# Patient Record
Sex: FEMALE | Race: WHITE | Employment: FULL TIME | URBAN - METROPOLITAN AREA
[De-identification: names, ages, dates, MRNs, and addresses within clinical notes are randomized per-mention and may not be internally consistent; named-entity substitution may affect disease eponyms.]

---

## 2018-09-09 ENCOUNTER — APPOINTMENT (OUTPATIENT)
Dept: GENERAL RADIOLOGY | Age: 63
End: 2018-09-09
Payer: COMMERCIAL

## 2018-09-09 ENCOUNTER — HOSPITAL ENCOUNTER (EMERGENCY)
Age: 63
Discharge: HOME | End: 2018-09-09
Attending: EMERGENCY MEDICINE
Payer: COMMERCIAL

## 2018-09-09 VITALS
BODY MASS INDEX: 19.73 KG/M2 | HEIGHT: 66 IN | WEIGHT: 122.8 LBS | RESPIRATION RATE: 16 BRPM | SYSTOLIC BLOOD PRESSURE: 95 MMHG | OXYGEN SATURATION: 98 % | DIASTOLIC BLOOD PRESSURE: 68 MMHG | HEART RATE: 58 BPM | TEMPERATURE: 97.8 F

## 2018-09-09 DIAGNOSIS — S46.002A INJURY OF LEFT ROTATOR CUFF, INITIAL ENCOUNTER: Primary | ICD-10-CM
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
## 2018-09-09 PROCEDURE — 6370000000 HC RX 637 (ALT 250 FOR IP): Performed by: NURSE PRACTITIONER

## 2018-09-09 PROCEDURE — 99283 EMERGENCY DEPT VISIT LOW MDM: CPT

## 2018-09-09 PROCEDURE — 73030 X-RAY EXAM OF SHOULDER: CPT

## 2018-09-09 RX ORDER — LEVOTHYROXINE SODIUM 175 UG/1
112 TABLET ORAL DAILY
COMMUNITY

## 2018-09-09 RX ORDER — ESCITALOPRAM OXALATE 20 MG/1
20 TABLET ORAL DAILY
COMMUNITY

## 2018-09-09 RX ORDER — HYDROCODONE BITARTRATE AND ACETAMINOPHEN 5; 325 MG/1; MG/1
1 TABLET ORAL EVERY 6 HOURS PRN
Qty: 20 TABLET | Refills: 0 | Status: SHIPPED | OUTPATIENT
Start: 2018-09-09 | End: 2018-09-14

## 2018-09-09 RX ORDER — TIZANIDINE 2 MG/1
2 TABLET ORAL EVERY 6 HOURS PRN
COMMUNITY

## 2018-09-09 RX ORDER — ASCORBIC ACID 500 MG
500 TABLET ORAL DAILY
COMMUNITY

## 2018-09-09 RX ORDER — IBUPROFEN 800 MG/1
800 TABLET ORAL EVERY 8 HOURS PRN
Qty: 20 TABLET | Refills: 0 | Status: SHIPPED | OUTPATIENT
Start: 2018-09-09

## 2018-09-09 RX ORDER — HYDROCODONE BITARTRATE AND ACETAMINOPHEN 5; 325 MG/1; MG/1
1 TABLET ORAL ONCE
Status: COMPLETED | OUTPATIENT
Start: 2018-09-09 | End: 2018-09-09

## 2018-09-09 RX ADMIN — HYDROCODONE BITARTRATE AND ACETAMINOPHEN 1 TABLET: 5; 325 TABLET ORAL at 16:50

## 2018-09-09 ASSESSMENT — PAIN DESCRIPTION - DESCRIPTORS: DESCRIPTORS: PRESSURE;ACHING;SHARP

## 2018-09-09 ASSESSMENT — PAIN SCALES - GENERAL: PAINLEVEL_OUTOF10: 9

## 2018-09-09 ASSESSMENT — ENCOUNTER SYMPTOMS: COLOR CHANGE: 0

## 2018-09-09 ASSESSMENT — PAIN DESCRIPTION - FREQUENCY: FREQUENCY: CONTINUOUS

## 2018-09-09 ASSESSMENT — PAIN DESCRIPTION - ORIENTATION: ORIENTATION: LEFT

## 2018-09-09 ASSESSMENT — PAIN DESCRIPTION - LOCATION: LOCATION: BACK

## 2018-09-09 NOTE — ED PROVIDER NOTES
The patient was seen and examined by me in conjunction with the mid-level provider. I agree with his/her assessment and treatment plan. My clinical impression is that she has a rotator cuff strain. She was advised not to use the sling for more than 3 days and she will follow-up with orthopedics.      Abbi Pearce MD  09/09/18 3391

## 2018-09-09 NOTE — ED NOTES
Patient is brought to room in [facility] and ice pack for shoulder and arm elevated with jethro Long RN  09/09/18 8664

## 2018-09-09 NOTE — ED PROVIDER NOTES
ACID) 500 MG TABLET    Take 500 mg by mouth daily       ALLERGIES     Patient has no known allergies. FAMILY HISTORY     History reviewed. No pertinent family history. SOCIAL HISTORY       Social History     Social History    Marital status:      Spouse name: N/A    Number of children: N/A    Years of education: N/A     Social History Main Topics    Smoking status: Current Some Day Smoker     Types: Cigarettes    Smokeless tobacco: Never Used    Alcohol use Yes      Comment: wine daily    Drug use: No    Sexual activity: Not Asked     Other Topics Concern    None     Social History Narrative    None         REVIEW OF SYSTEMS    (2-9 systems for level 4, 10 or more for level 5)     Review of Systems   Musculoskeletal: Positive for arthralgias. Skin: Negative for color change and wound. All other systems reviewed and are negative. Except as noted above the remainder of the review of systems was reviewed and negative. PHYSICAL EXAM    (up to 7 for level 4, 8 or more for level 5)     ED Triage Vitals [09/09/18 1557]   BP Temp Temp Source Pulse Resp SpO2 Height Weight   95/68 97.8 °F (36.6 °C) Oral 58 16 98 % 5' 6\" (1.676 m) 122 lb 12.8 oz (55.7 kg)       Physical Exam   Constitutional: She is oriented to person, place, and time. She appears well-developed and well-nourished. HENT:   Head: Normocephalic and atraumatic. Right Ear: External ear normal.   Left Ear: External ear normal.   Nose: Nose normal.   Mouth/Throat: Oropharynx is clear and moist.   Eyes: Pupils are equal, round, and reactive to light. Conjunctivae and EOM are normal.   Neck: Normal range of motion. Neck supple. Cardiovascular: Intact distal pulses and normal pulses. Pulses:       Radial pulses are 2+ on the left side. Pulmonary/Chest: Effort normal. No respiratory distress. Musculoskeletal:        Left shoulder: She exhibits decreased range of motion, tenderness, pain and decreased strength. Left elbow: Normal.        Right wrist: Normal.        Left wrist: Normal.        Left upper arm: Normal.        Left forearm: Normal.        Right hand: Normal.        Left hand: Normal.   Patient exhibits decreased range of motion with her left shoulder. There is no deformity. She is barely able to lift her left arm off the bed. She exhibits pain palpation to the anterior lateral aspect of the shoulder. There is no ecchymosis or erythema. Neurological: She is alert and oriented to person, place, and time. She has normal strength and normal reflexes. No cranial nerve deficit or sensory deficit. Skin: Skin is warm, dry and intact. No ecchymosis and no rash noted. No erythema. Psychiatric: She has a normal mood and affect. Her behavior is normal. Judgment and thought content normal.         DIAGNOSTIC RESULTS     EKG: All EKG's are interpreted by the Emergency Department Physician who either signs or Co-signs this chart in the absence of a cardiologist.        RADIOLOGY:   Non-plain film images such as CT, Ultrasound and MRI are read by the radiologist. [facility] radiographic images are visualized and preliminarily interpreted by the emergency physician with the below findings:  Xr Shoulder Left (min 2 Views)    Result Date: 9/9/2018  EXAMINATION: 3 XRAY VIEWS OF THE LEFT SHOULDER 9/9/2018 4:46 pm COMPARISON: None. HISTORY: ORDERING SYSTEM PROVIDED HISTORY: Pain, injury from fall today TECHNOLOGIST PROVIDED HISTORY: Pain, injury from fall today Ordering Physician Provided Reason for Exam: fall Acuity: Acute Type of Exam: Initial FINDINGS: The humeral head is well circumscribed and situated within the glenoid fossa. No acute fracture, dislocation, or effusion is noted. Mild acromioclavicular degenerative changes are present. Soft tissues are unremarkable with exception of compression plate and screws in situ at the base of the cervical spine. Mild degenerative changes in the acromioclavicular joint.   No

## 2018-09-11 ENCOUNTER — OFFICE VISIT (OUTPATIENT)
Dept: ORTHOPEDIC SURGERY | Age: 63
End: 2018-09-11
Payer: COMMERCIAL

## 2018-09-11 VITALS — HEIGHT: 66 IN | WEIGHT: 122.8 LBS | BODY MASS INDEX: 19.73 KG/M2

## 2018-09-11 DIAGNOSIS — S40.022A CONTUSION OF MULTIPLE SITES OF LEFT SHOULDER AND UPPER ARM, INITIAL ENCOUNTER: Primary | ICD-10-CM

## 2018-09-11 DIAGNOSIS — S40.012A CONTUSION OF MULTIPLE SITES OF LEFT SHOULDER AND UPPER ARM, INITIAL ENCOUNTER: Primary | ICD-10-CM

## 2018-09-11 PROCEDURE — 99202 OFFICE O/P NEW SF 15 MIN: CPT | Performed by: ORTHOPAEDIC SURGERY

## 2018-09-11 RX ORDER — IRON POLYSACCHARIDE COMPLEX 150 MG
1 CAPSULE ORAL
COMMUNITY

## 2018-09-11 RX ORDER — GABAPENTIN 600 MG/1
400 TABLET ORAL 3 TIMES DAILY
COMMUNITY

## 2018-09-11 NOTE — PROGRESS NOTES
This 80-year-old patient is seen here because of an injury she sustained to her left shoulder on September 9. She was a caulking up the steps in her house and missed it and fell onto the left shoulder. She got to the emergency room at [facility]. and on September 9. He was given a sling and advised to follow-up here. The patient denies having had any problem with the shoulder before. There is no numbness or tingling. She describes the pain right over the shoulder area and slightly anterolaterally. Examination: There is no ecchymosis and no swelling. Her tenderness was generalized. She did have limitation of all the motions. X-rays: I reviewed the x-rays which showed no abnormality. Diagnosis contusion left shoulder. Treatment: I have advised her to start pendulum exercises in about 10 days' time and I will see her in 2 weeks. As far as the sling is concerned she can take it off for shower and also if she feels comfortable she does not need to use it. At next visit if she is not improving then may require physical therapy.

## 2018-10-09 ENCOUNTER — OFFICE VISIT (OUTPATIENT)
Dept: ORTHOPEDIC SURGERY | Age: 63
End: 2018-10-09
Payer: COMMERCIAL

## 2018-10-09 VITALS — WEIGHT: 125 LBS | HEIGHT: 67 IN | BODY MASS INDEX: 19.62 KG/M2

## 2018-10-09 DIAGNOSIS — M75.102 TEAR OF LEFT ROTATOR CUFF, UNSPECIFIED TEAR EXTENT: ICD-10-CM

## 2018-10-09 DIAGNOSIS — M25.512 ACUTE PAIN OF LEFT SHOULDER: Primary | ICD-10-CM

## 2018-10-09 PROCEDURE — 99213 OFFICE O/P EST LOW 20 MIN: CPT | Performed by: ORTHOPAEDIC SURGERY

## 2018-10-09 PROCEDURE — 20610 DRAIN/INJ JOINT/BURSA W/O US: CPT | Performed by: ORTHOPAEDIC SURGERY

## 2018-10-09 RX ORDER — METHYLPREDNISOLONE ACETATE 40 MG/ML
40 INJECTION, SUSPENSION INTRA-ARTICULAR; INTRALESIONAL; INTRAMUSCULAR; SOFT TISSUE ONCE
Status: COMPLETED | OUTPATIENT
Start: 2018-10-09 | End: 2018-10-09

## 2018-10-09 RX ADMIN — METHYLPREDNISOLONE ACETATE 40 MG: 40 INJECTION, SUSPENSION INTRA-ARTICULAR; INTRALESIONAL; INTRAMUSCULAR; SOFT TISSUE at 08:18

## 2018-10-16 ENCOUNTER — HOSPITAL ENCOUNTER (OUTPATIENT)
Dept: MRI IMAGING | Age: 63
Discharge: HOME | End: 2018-10-18
Payer: COMMERCIAL

## 2018-10-16 DIAGNOSIS — M75.102 TEAR OF LEFT ROTATOR CUFF, UNSPECIFIED TEAR EXTENT: ICD-10-CM

## 2018-10-16 PROCEDURE — 73221 MRI JOINT UPR EXTREM W/O DYE: CPT

## 2018-10-25 ENCOUNTER — OFFICE VISIT (OUTPATIENT)
Dept: ORTHOPEDIC SURGERY | Age: 63
End: 2018-10-25
Payer: COMMERCIAL

## 2018-10-25 VITALS — WEIGHT: 125 LBS | BODY MASS INDEX: 20.09 KG/M2 | HEIGHT: 66 IN

## 2018-10-25 DIAGNOSIS — S42.255D CLOSED NONDISPLACED FRACTURE OF GREATER TUBEROSITY OF LEFT HUMERUS WITH ROUTINE HEALING, SUBSEQUENT ENCOUNTER: Primary | ICD-10-CM

## 2018-10-25 PROBLEM — S42.255A CLOSED NONDISPLACED FRACTURE OF GREATER TUBEROSITY OF LEFT HUMERUS: Status: ACTIVE | Noted: 2018-10-25

## 2018-10-25 PROCEDURE — 23620 CLTX GR HMRL TBRS FX WO MNPJ: CPT | Performed by: ORTHOPAEDIC SURGERY

## 2018-10-25 PROCEDURE — 99213 OFFICE O/P EST LOW 20 MIN: CPT | Performed by: ORTHOPAEDIC SURGERY

## 2018-11-27 ENCOUNTER — OFFICE VISIT (OUTPATIENT)
Dept: ORTHOPEDIC SURGERY | Age: 63
End: 2018-11-27
Payer: COMMERCIAL

## 2018-11-27 VITALS — HEIGHT: 66 IN | BODY MASS INDEX: 20.09 KG/M2 | WEIGHT: 125 LBS

## 2018-11-27 DIAGNOSIS — M25.512 ACUTE PAIN OF LEFT SHOULDER: ICD-10-CM

## 2018-11-27 DIAGNOSIS — S42.255D CLOSED NONDISPLACED FRACTURE OF GREATER TUBEROSITY OF LEFT HUMERUS WITH ROUTINE HEALING, SUBSEQUENT ENCOUNTER: ICD-10-CM

## 2018-11-27 DIAGNOSIS — M75.42 IMPINGEMENT SYNDROME OF LEFT SHOULDER REGION: Primary | ICD-10-CM

## 2018-11-27 PROCEDURE — 20610 DRAIN/INJ JOINT/BURSA W/O US: CPT | Performed by: ORTHOPAEDIC SURGERY

## 2018-11-27 PROCEDURE — 99213 OFFICE O/P EST LOW 20 MIN: CPT | Performed by: ORTHOPAEDIC SURGERY

## 2018-11-27 RX ORDER — METHYLPREDNISOLONE ACETATE 40 MG/ML
40 INJECTION, SUSPENSION INTRA-ARTICULAR; INTRALESIONAL; INTRAMUSCULAR; SOFT TISSUE ONCE
Status: COMPLETED | OUTPATIENT
Start: 2018-11-27 | End: 2018-11-27

## 2018-11-27 RX ADMIN — METHYLPREDNISOLONE ACETATE 40 MG: 40 INJECTION, SUSPENSION INTRA-ARTICULAR; INTRALESIONAL; INTRAMUSCULAR; SOFT TISSUE at 07:55

## 2018-11-27 NOTE — PROGRESS NOTES
This patient who has sustained a fracture left greater tuberosity diagnosis only per MRI is going to physical therapy. She says she still having some problem getting full motion of the shoulder. The patient has also previously had undergone surgery for his fusion of the C5-C6 and was told by the treating physician that she may need extension of the fusion. Examination: The shoulder does have excellent range of motion but she has also positive impingement sign particularly in abduction at the extreme. There was no drop arm to signed. Examination of the back shows that she has minimal scoliosis on the left side. This was prompted by noting that the left shoulder was higher than the right side. I reviewed the x-rays with her which also show that she has acromioclavicular joint arthrosis. X-rays: I reviewed the x-rays of the shoulder with her which shows that she does have some acromioclavicular joint arthrosis base seen on the MRI. I also reviewed x-rays of the chest she has had in the past which shows minimal high thoracic scoliosis. Diagnosis: Impingement syndrome left shoulder. #2 healing fracture greater tuberosity left shoulder. #3 cervical spondylosis status post C5-C6 fusion. Treatment: After thoroughly cleaning the skin with Betadine and alcohol I injected the subacromial space with 40 mg Depo-Medrol and 5 mL of 1% plain lidocaine and she did immediate excellent response to this suggesting shoulder being the cause of for residual symptoms of pain and some impingement. I have told her out of the injection works and I will see her in 3 weeks' time but should her symptoms have subsided that she will call up and cancel the appointment.

## 2020-11-26 ENCOUNTER — APPOINTMENT (OUTPATIENT)
Dept: GENERAL RADIOLOGY | Age: 65
End: 2020-11-26
Payer: COMMERCIAL

## 2020-11-26 ENCOUNTER — HOSPITAL ENCOUNTER (EMERGENCY)
Age: 65
Discharge: HOME | End: 2020-11-26
Attending: EMERGENCY MEDICINE
Payer: COMMERCIAL

## 2020-11-26 ENCOUNTER — NURSE TRIAGE (OUTPATIENT)
Dept: OTHER | Facility: CLINIC | Age: 65
End: 2020-11-26

## 2020-11-26 VITALS
WEIGHT: 106.2 LBS | OXYGEN SATURATION: 100 % | RESPIRATION RATE: 16 BRPM | HEART RATE: 65 BPM | SYSTOLIC BLOOD PRESSURE: 113 MMHG | TEMPERATURE: 98.6 F | BODY MASS INDEX: 17.07 KG/M2 | DIASTOLIC BLOOD PRESSURE: 68 MMHG | HEIGHT: 66 IN

## 2020-11-26 LAB
SARS-COV-2, RAPID: ABNORMAL
SARS-COV-2: ABNORMAL
SARS-COV-2: DETECTED
SOURCE: ABNORMAL

## 2020-11-26 PROCEDURE — 99282 EMERGENCY DEPT VISIT SF MDM: CPT

## 2020-11-26 PROCEDURE — U0003 INFECTIOUS AGENT DETECTION BY NUCLEIC ACID (DNA OR RNA); SEVERE ACUTE RESPIRATORY SYNDROME CORONAVIRUS 2 (SARS-COV-2) (CORONAVIRUS DISEASE [COVID-19]), AMPLIFIED PROBE TECHNIQUE, MAKING USE OF HIGH THROUGHPUT TECHNOLOGIES AS DESCRIBED BY CMS-2020-01-R: HCPCS

## 2020-11-26 PROCEDURE — 71045 X-RAY EXAM CHEST 1 VIEW: CPT

## 2020-11-26 RX ORDER — OXYCODONE HYDROCHLORIDE AND ACETAMINOPHEN 5; 325 MG/1; MG/1
1 TABLET ORAL EVERY 8 HOURS PRN
COMMUNITY

## 2020-11-26 RX ORDER — HYDROXYCHLOROQUINE SULFATE 200 MG/1
TABLET, FILM COATED ORAL NIGHTLY
COMMUNITY

## 2020-11-26 RX ORDER — AZITHROMYCIN 250 MG/1
250 TABLET, FILM COATED ORAL SEE ADMIN INSTRUCTIONS
Qty: 6 TABLET | Refills: 0 | Status: SHIPPED | OUTPATIENT
Start: 2020-11-26 | End: 2020-12-01

## 2020-11-26 RX ORDER — SERTRALINE HYDROCHLORIDE 100 MG/1
100 TABLET, FILM COATED ORAL NIGHTLY
COMMUNITY

## 2020-11-26 ASSESSMENT — ENCOUNTER SYMPTOMS
COLOR CHANGE: 0
EYE REDNESS: 0
COUGH: 1
SORE THROAT: 0
RHINORRHEA: 0
SHORTNESS OF BREATH: 1
NAUSEA: 0
VOMITING: 0
EYE DISCHARGE: 0
DIARRHEA: 0

## 2020-11-26 ASSESSMENT — PAIN DESCRIPTION - DESCRIPTORS: DESCRIPTORS: ACHING;SHARP

## 2020-11-26 ASSESSMENT — PAIN SCALES - GENERAL: PAINLEVEL_OUTOF10: 7

## 2020-11-26 ASSESSMENT — PAIN DESCRIPTION - FREQUENCY: FREQUENCY: CONTINUOUS

## 2020-11-26 ASSESSMENT — PAIN DESCRIPTION - LOCATION: LOCATION: GENERALIZED;HEAD

## 2020-11-26 NOTE — TELEPHONE ENCOUNTER
CALL RECEIVED FROM: Patient  PATIENT STATES: yesterday symptoms started. Per screening tool fever, muscle pain, joint pain, headache, weakness, fatigue, cough, sob, red eyes, sore throat. PROTOCOL RECOMMENDATION: Go to ED, care advice given and understood. TRANSFER TO:   PLEASE DO NOT RESPOND TO THIS TRIAGE NOTE THROUGH THIS ENCOUNTER. ANY SUBSEQUENT COMMUNICATION SHOULD BE DIRECTLY WITH THE PATIENT. Reason for Disposition   MILD difficulty breathing (e.g., minimal/no SOB at rest, SOB with walking, pulse <100)    Answer Assessment - Initial Assessment Questions  1. COVID-19 DIAGNOSIS: \"Who made your Coronavirus (COVID-19) diagnosis? \" \"Was it confirmed by a positive lab test?\" If not diagnosed by a HCP, ask \"Are there lots of cases (community spread) where you live? \" (See public health department website, if unsure)      n/a  2. COVID-19 EXPOSURE: \"Was there any known exposure to COVID before the symptoms began? \" CDC Definition of close contact: within 6 feet (2 meters) for a total of 15 minutes or more over a 24-hour period. Yes, from work  3. ONSET: \"When did the COVID-19 symptoms start? \"       yesterday  4. WORST SYMPTOM: \"What is your worst symptom? \" (e.g., cough, fever, shortness of breath, muscle aches)      Headache and bodyache  5. COUGH: \"Do you have a cough? \" If so, ask: \"How bad is the cough? \"        Dry cough, frequent, mild  6. FEVER: \"Do you have a fever? \" If so, ask: \"What is your temperature, how was it measured, and when did it start? \"      101 this morning  7. RESPIRATORY STATUS: \"Describe your breathing? \" (e.g., shortness of breath, wheezing, unable to speak)       Shallow breathing, tightness  8. BETTER-SAME-WORSE: Catheline Aus you getting better, staying the same or getting worse compared to yesterday? \"  If getting worse, ask, \"In what way? \"      worse  9. HIGH RISK DISEASE: \"Do you have any chronic medical problems? \" (e.g., asthma, heart or lung disease, weak immune system, obesity, etc.)      Lupus possible,   10. PREGNANCY: \"Is there any chance you are pregnant? \" \"When was your last menstrual period? \"        n/a  11. OTHER SYMPTOMS: \"Do you have any other symptoms? \"  (e.g., chills, fatigue, headache, loss of smell or taste, muscle pain, sore throat; new loss of smell or taste especially support the diagnosis of COVID-19)        See above    Protocols used: CORONAVIRUS (COVID-19) DIAGNOSED OR SUSPECTED-ADULT-

## 2020-11-26 NOTE — ED PROVIDER NOTES
EMERGENCY DEPARTMENT ENCOUNTER    Pt Name: Alexis Oscar  MRN: [id number]  [place] 1955  Date of evaluation: 11/26/20  CHIEF COMPLAINT       Chief Complaint   Patient presents with    Shortness of Breath     onset yesterday    Cough    Headache     HISTORY OF PRESENT ILLNESS   This is a 81-year-old female that presents with complaints of mild sore throat, some cough and shortness of breath with possible exposure to Covid. 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 Patient complains of some shortness of breath, cough, some mild sputum production. She denies any fevers, she has an associated mild headache. Patient denies any abdominal pain nausea or vomiting. REVIEW OF SYSTEMS     Review of Systems   Constitutional: Negative for chills and fever. HENT: Negative for rhinorrhea and sore throat. Eyes: Negative for discharge, redness and visual disturbance. Respiratory: Positive for cough and shortness of breath. Cardiovascular: Negative for chest pain, palpitations and leg swelling. Gastrointestinal: Negative for diarrhea, nausea and vomiting. Musculoskeletal: Negative for arthralgias, myalgias and neck pain. Skin: Negative for color change and rash. Neurological: Negative for seizures, weakness and headaches. Psychiatric/Behavioral: Negative for hallucinations, self-injury and suicidal ideas.      PASTMEDICAL HISTORY     Past Medical History:   Diagnosis Date    Anemia     Bladder spasm     Depression     Spinal stenosis     Thyroid disease      Past Problem List  Patient Active Problem List   Diagnosis Code    Closed nondisplaced fracture of greater tuberosity of left humerus S42.255A     SURGICAL HISTORY       Past Surgical History:   Procedure Laterality Date    ABDOMEN SURGERY      CERVICAL FUSION      LUMBAR FUSION      WRIST SURGERY Left      CURRENT MEDICATIONS       Previous Medications    ESCITALOPRAM (LEXAPRO) 20 MG TABLET    Take 20 mg by mouth daily    GABAPENTIN (NEURONTIN) 600 MG TABLET    Take 400 mg by mouth 3 times daily. HYDROXYCHLOROQUINE (PLAQUENIL) 200 MG TABLET    Take by mouth nightly    IBUPROFEN (ADVIL;MOTRIN) 800 MG TABLET    Take 1 tablet by mouth every 8 hours as needed for Pain    IRON PO    Take by mouth daily    IRON POLYSACCHARIDES (NIFEREX) 150 MG CAPSULE    Take 1 capsule by mouth    LEVOTHYROXINE (SYNTHROID) 175 MCG TABLET    Take 112 mcg by mouth Daily Indications: 6 days a week     MIRABEGRON ER (MYRBETRIQ) 25 MG TB24    Myrbetriq 25 mg tablet,extended release   Take 1 tablet every day by oral route. MULTIPLE VITAMINS-MINERALS (MULTIVITAMIN ADULT PO)    Take by mouth daily    OXYBUTYNIN CHLORIDE (DITROPAN PO)    Take 20 mg by mouth daily    OXYCODONE-ACETAMINOPHEN (PERCOCET) 5-325 MG PER TABLET    Take 1 tablet by mouth every 8 hours as needed for Pain. SERTRALINE (ZOLOFT) 100 MG TABLET    Take 100 mg by mouth nightly    TIZANIDINE (ZANAFLEX) 2 MG TABLET    Take 2 mg by mouth every 6 hours as needed    VITAMIN C (ASCORBIC ACID) 500 MG TABLET    Take 500 mg by mouth daily     ALLERGIES     is allergic to no known allergies. FAMILY HISTORY     has no family status information on file. SOCIAL HISTORY       Social History     Tobacco Use    Smoking status: Former Smoker     Types: Cigarettes    Smokeless tobacco: Never Used   Substance Use Topics    Alcohol use: Yes     Comment: wine daily    Drug use: No     PHYSICAL EXAM     INITIAL VITALS: /68   Pulse 65   Temp 98.6 °F (37 °C) (Oral)   Resp 16   Ht 5' 6\" (1.676 m)   Wt 106 lb 3.2 oz (48.2 kg)   SpO2 100%   BMI 17.14 kg/m²    Physical Exam  Constitutional:       Appearance: Normal appearance. She is well-developed. She is not ill-appearing or toxic-appearing. HENT:      Head: Normocephalic and atraumatic.       Right Ear: Tympanic membrane and ear canal normal.      Left Ear: Tympanic membrane and ear canal normal.      Mouth/Throat:      Pharynx: Oropharynx is clear. Uvula midline. Eyes:      Conjunctiva/sclera: Conjunctivae normal.      Pupils: Pupils are equal, round, and reactive to light. Neck:      Musculoskeletal: Normal range of motion and neck supple. Trachea: Trachea normal.   Cardiovascular:      Rate and Rhythm: Normal rate and regular rhythm. Heart sounds: S1 normal and S2 normal. No murmur. Pulmonary:      Effort: Pulmonary effort is normal. No accessory muscle usage or respiratory distress. Breath sounds: Normal breath sounds. Comments: Mild dry cough  Chest:      Chest wall: No deformity or tenderness. Abdominal:      General: Bowel sounds are normal. There is no distension or abdominal bruit. Palpations: Abdomen is not rigid. Tenderness: There is no abdominal tenderness. There is no guarding or rebound. Skin:     General: Skin is warm. Findings: No rash. Neurological:      Mental Status: She is alert and oriented to person, place, and time. GCS: GCS eye subscore is 4. GCS verbal subscore is 5. GCS motor subscore is 6. Psychiatric:         Speech: Speech normal.         MEDICAL DECISION MAKIN-year-old female presents with complaints of a cough and possible exposure to Covid, plan is chest x-ray, Covid screen and reevaluation. CRITICAL CARE:       PROCEDURES:    Procedures    DIAGNOSTIC RESULTS   EKG:All EKG's are interpreted by the Emergency Department Physician who either signs or Co-signs this chart in the absence of a cardiologist.        RADIOLOGY:All plain film, CT, MRI, and formal ultrasound images (except ED bedside ultrasound) are read by the radiologist, see reports below, unless otherwisenoted in MDM or here. XR CHEST PORTABLE   Final Result   Stable chronic pulmonary change without acute cardiopulmonary process.            LABS: All lab results were reviewed by

## 2020-11-27 ENCOUNTER — CARE COORDINATION (OUTPATIENT)
Dept: CARE COORDINATION | Age: 65
End: 2020-11-27

## 2020-11-27 NOTE — CARE COORDINATION
questions related to their healthcare. Author provided contact information for future reference. Patient/family/caregiver given information for [facility] and agrees to enroll yes  Patient's preferred e-mail: [email or web address]   Patient's preferred phone number: [phone]  Based on Loop alert triggers, patient will be contacted by nurse care manager for worsening symptoms. Patient relays PCP office is closed today, will call on Monday to make a follow up appointment.

## 2020-11-28 ENCOUNTER — CARE COORDINATION (OUTPATIENT)
Dept: CARE COORDINATION | Age: 65
End: 2020-11-28

## 2020-11-28 NOTE — CARE COORDINATION
Yellow alert noted in Loop remote symptom monitoring program. Messaged patient to notify Yuval Cervantes if symptoms have worsened since yesterday. RN response  Thank you for reporting your symptoms. This is a very trying and scary time and may people feel anxious, considering everything. Do you feel that your symptoms have worsened today from yesterday? Make sure you are drinking plenty of water, eating nutritious meals, and getting plenty of rest. Continue to quarantine at home if you are covid+ or awaiting test results. You may call me at [phone] until 1pm today if you need to speak with a nurse.

## 2020-11-30 ENCOUNTER — CARE COORDINATION (OUTPATIENT)
Dept: CARE COORDINATION | Age: 65
End: 2020-11-30

## 2020-11-30 NOTE — CARE COORDINATION
Yellow alert noted in Loop remote symptom monitoring program. Messaged patient to notify Paulina Holliday if symptoms have worsened since yesterday. Norris Good Afternoon and thank you for checking in with us today! How are you feeling? Are your symptoms improving? The same? Please let us know if any of your symptoms are worse than yesterday, or if you would like to speak with a nurse. We are available 8-4 today. Thank you- Uzma SALGADO    Patient responded to message:  Maggi Henriquez, 2:39 PM  I am having stomach upset and a little more trouble breathing; but Ill be ok    Message to patient:  Norris, thank you for the update. I'm sorry to hear you are not feeling well yet. Stick to a bland diet until your stomach settles. You should follow up with your doctor if nausea continues and the shortness of breath. If your shortness of breath worsens, you have to stop walking to catch your breath, or have any chest pain, you need to go to the ER.  Keep checking in each day and let us know how you are doing. Nannette Rogers RN

## 2020-12-01 ENCOUNTER — CARE COORDINATION (OUTPATIENT)
Dept: CARE COORDINATION | Age: 65
End: 2020-12-01

## 2020-12-01 NOTE — CARE COORDINATION
Yellow alert noted in Loop remote symptom monitoring program. Messaged patient to notify Carri Ramirez if symptoms have worsened since yesterday. Thank you for checking in with us. We would like to know if your symptoms are improving since you were seen? If they are not or if you would like to speak with a nurse, please let us know. Loop nurses are available M-F 8-4 and S/S 9-1. We hope that you are improving each day.  DAMIAN Hadadd     No response in loop

## 2020-12-02 ENCOUNTER — CARE COORDINATION (OUTPATIENT)
Dept: CARE COORDINATION | Age: 65
End: 2020-12-02

## 2020-12-03 ENCOUNTER — CARE COORDINATION (OUTPATIENT)
Dept: CARE COORDINATION | Age: 65
End: 2020-12-03

## 2020-12-03 NOTE — CARE COORDINATION
Yellow alert noted in Loop remote symptom monitoring program. Messaged patient to notify Mariposa Bolaños if symptoms have worsened since yesterday. Thank you for checking in with us. We would like to know if your symptoms are improving since you were seen? If they are not or if you would like to speak with a nurse, please let us know. Loop nurses are available M-F 8-4 and S/S 9-1. We hope that you are improving each day. DAMIAN Haddad, 10:12 AM   Thank you for being a resource. One of the biggest things Im struggling with now is feeling so depressed. I guess this is brought out some things about my life that Theotis Sorrento been struggling with anyway. But I am feeling better physically. Norris, thank you for the update. Glad to hear that you are physically feeling better. I would recommend speaking with your primary care physician regarding depression. It  Is important to seek help. Please let us know if we can help.   Deepika Camacho RN

## 2020-12-04 ENCOUNTER — CARE COORDINATION (OUTPATIENT)
Dept: CARE COORDINATION | Age: 65
End: 2020-12-04

## 2020-12-04 NOTE — CARE COORDINATION
Yellow alert noted in Loop remote symptom monitoring program. Messaged patient to notify Yuval Cervantes if symptoms have worsened since yesterday. Thank you for checking in with us. We would like to know if your symptoms are improving since you were seen? If they are not or if you would like to speak with a nurse, please let us know. Loop nurses are available M-F 8-4 and S/S 9-1. We hope that you are improving each day.  DAMIAN Haddad     No response in loop

## 2020-12-05 ENCOUNTER — CARE COORDINATION (OUTPATIENT)
Dept: CARE COORDINATION | Age: 65
End: 2020-12-05

## 2020-12-05 NOTE — CARE COORDINATION
Yellow alert noted in Loop remote symptom monitoring program. Messaged patient to notify Paulina Holliday if symptoms have worsened since yesterday. Thank you for checking in with us. We would like to know if your symptoms are improving since you were seen? If they are not or if you would like to speak with a nurse, please let us know. Loop nurses are available M-F 8-4 and S/S 9-1. Have you checked in with your primary care regarding your depression/anxiety? We hope that you are improving each day.  DAMIAN Haddad   No response in loop

## 2020-12-07 ENCOUNTER — CARE COORDINATION (OUTPATIENT)
Dept: CARE COORDINATION | Age: 65
End: 2020-12-07

## 2020-12-07 NOTE — CARE COORDINATION
Yellow alert noted in Loop remote symptom monitoring program. Messaged patient to notify Luna Calderón if symptoms have worsened since yesterday. Ceferino Hodge RN, 2:10 PM  Hello, thank you for checking in with us. My name is Ceferino Hodge RN with the [facility] [address] [place] [address] Care Team. Please let us know if your symptoms are worse today than yesterday or if you wish to speak to a nurse. You can either call me at [phone] or message us back. We are available between 8 am to 4 pm Mon-Fri. and 9 am to 1 pm on the weekends.

## 2020-12-10 ENCOUNTER — CARE COORDINATION (OUTPATIENT)
Dept: CARE COORDINATION | Age: 65
End: 2020-12-10

## 2020-12-10 NOTE — CARE COORDINATION
Yellow alert noted in Loop remote symptom monitoring program. Messaged patient to notify Mario Mayes if symptoms have worsened since yesterday.

## 2020-12-13 ENCOUNTER — CARE COORDINATION (OUTPATIENT)
Dept: CARE COORDINATION | Age: 65
End: 2020-12-13

## 2020-12-13 NOTE — CARE COORDINATION
Yellow alert noted in Loop remote symptom monitoring program. Messaged patient to notify Mariposa Miky if symptoms have worsened since yesterday. Norris, Good Morning and thank you for checking in with us this morning! How are you feeling? Have your symptoms improved? Please let us know if your symptoms have worsened, or if you would like to speak with a nurse. We are available 9-1 today.  Thank You- [facility] SALGADO

## 2022-12-04 ENCOUNTER — APPOINTMENT (OUTPATIENT)
Dept: CT IMAGING | Age: 67
End: 2022-12-04
Payer: COMMERCIAL

## 2022-12-04 ENCOUNTER — APPOINTMENT (OUTPATIENT)
Dept: GENERAL RADIOLOGY | Age: 67
End: 2022-12-04
Payer: COMMERCIAL

## 2022-12-04 ENCOUNTER — HOSPITAL ENCOUNTER (EMERGENCY)
Age: 67
Discharge: HOME | End: 2022-12-04
Attending: EMERGENCY MEDICINE
Payer: COMMERCIAL

## 2022-12-04 VITALS
SYSTOLIC BLOOD PRESSURE: 131 MMHG | HEART RATE: 54 BPM | DIASTOLIC BLOOD PRESSURE: 90 MMHG | OXYGEN SATURATION: 100 % | WEIGHT: 98 LBS | HEIGHT: 66 IN | BODY MASS INDEX: 15.75 KG/M2 | RESPIRATION RATE: 12 BRPM

## 2022-12-04 DIAGNOSIS — S43.005A DISLOCATION OF LEFT SHOULDER JOINT, INITIAL ENCOUNTER: ICD-10-CM

## 2022-12-04 DIAGNOSIS — S42.255A CLOSED NONDISPLACED FRACTURE OF GREATER TUBEROSITY OF LEFT HUMERUS, INITIAL ENCOUNTER: ICD-10-CM
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
## 2022-12-04 PROCEDURE — 72125 CT NECK SPINE W/O DYE: CPT

## 2022-12-04 PROCEDURE — 24505 CLTX HUMRL SHFT FX W/MNPJ: CPT

## 2022-12-04 PROCEDURE — 73060 X-RAY EXAM OF HUMERUS: CPT

## 2022-12-04 PROCEDURE — 72170 X-RAY EXAM OF PELVIS: CPT

## 2022-12-04 PROCEDURE — 6360000002 HC RX W HCPCS: Performed by: EMERGENCY MEDICINE

## 2022-12-04 PROCEDURE — 73030 X-RAY EXAM OF SHOULDER: CPT

## 2022-12-04 PROCEDURE — 2700000000 HC OXYGEN THERAPY PER DAY

## 2022-12-04 PROCEDURE — 70450 CT HEAD/BRAIN W/O DYE: CPT

## 2022-12-04 PROCEDURE — 99285 EMERGENCY DEPT VISIT HI MDM: CPT

## 2022-12-04 PROCEDURE — 71045 X-RAY EXAM CHEST 1 VIEW: CPT

## 2022-12-04 RX ORDER — IBUPROFEN 600 MG/1
600 TABLET ORAL 3 TIMES DAILY PRN
Qty: 30 TABLET | Refills: 0 | Status: SHIPPED | OUTPATIENT
Start: 2022-12-04

## 2022-12-04 RX ORDER — PROPOFOL 10 MG/ML
150 INJECTION, EMULSION INTRAVENOUS ONCE
Status: COMPLETED | OUTPATIENT
Start: 2022-12-04 | End: 2022-12-04

## 2022-12-04 RX ORDER — 0.9 % SODIUM CHLORIDE 0.9 %
1000 INTRAVENOUS SOLUTION INTRAVENOUS ONCE
Status: DISCONTINUED | OUTPATIENT
Start: 2022-12-04 | End: 2022-12-04 | Stop reason: HOSPADM

## 2022-12-04 RX ORDER — PRENATAL VIT 27,CALC/IRON/FA 60 MG-1 MG
1 TABLET ORAL DAILY
COMMUNITY

## 2022-12-04 RX ADMIN — PROPOFOL 50 MG: 10 INJECTION, EMULSION INTRAVENOUS at 06:08

## 2022-12-04 ASSESSMENT — ENCOUNTER SYMPTOMS
TROUBLE SWALLOWING: 0
EYE PAIN: 0
NAUSEA: 0
VOICE CHANGE: 0
FACIAL SWELLING: 0
PHOTOPHOBIA: 0
ABDOMINAL PAIN: 0
VISUAL CHANGE: 0
SHORTNESS OF BREATH: 0
BACK PAIN: 0
COLOR CHANGE: 0
CHEST TIGHTNESS: 0
VOMITING: 0

## 2022-12-04 ASSESSMENT — PAIN - FUNCTIONAL ASSESSMENT: PAIN_FUNCTIONAL_ASSESSMENT: 0-10

## 2022-12-04 ASSESSMENT — PAIN SCALES - GENERAL: PAINLEVEL_OUTOF10: 10

## 2022-12-04 NOTE — DISCHARGE INSTRUCTIONS
Return to this emergency room immediately if your symptoms persist, worsen or if new ones form. Make sure you follow-up with orthopedic surgery, Dr. Noah Walker, and your primary care doctor within the next 1-2 business days.

## 2022-12-04 NOTE — ED PROVIDER NOTES
EMERGENCY DEPARTMENT ENCOUNTER    Pt Name: Jayro Martino  MRN: [id number]  [place] 1955  Date of evaluation: 12/4/22  CHIEF COMPLAINT       Chief Complaint   Patient presents with    Fall     L shoulder     HISTORY OF PRESENT ILLNESS   Presents the ER stating that she fell in her bathtub. Patient fell backwards and landed on her side. Patient denies any her head or losing any consciousness. Patient does admit to hurting her left shoulder and left upper extremity at the humerus level. The history is provided by the patient. Fall  The accident occurred 1 to 2 hours ago. The fall occurred while walking. She fell from a height of 3 to 5 ft. She landed on A hard floor. The point of impact was the left shoulder. The pain is present in the left shoulder. Pertinent negatives include no visual change, no abdominal pain, no nausea, no vomiting, no headaches and no loss of consciousness. REVIEW OF SYSTEMS     Review of Systems   Constitutional:  Negative for activity change, appetite change and fatigue. HENT:  Negative for facial swelling, trouble swallowing and voice change. Eyes:  Negative for photophobia and pain. Respiratory:  Negative for chest tightness and shortness of breath. Cardiovascular:  Negative for chest pain and palpitations. Gastrointestinal:  Negative for abdominal pain, nausea and vomiting. Genitourinary:  Negative for dysuria and urgency. Musculoskeletal:  Positive for arthralgias (L shoulder). Negative for back pain. Skin:  Negative for color change and rash. Neurological:  Negative for dizziness, loss of consciousness, syncope and headaches. Psychiatric/Behavioral:  Negative for behavioral problems and hallucinations.     PASTMEDICAL HISTORY     Past Medical History:   Diagnosis Date    Anemia     Bladder spasm     Depression     Spinal stenosis     Thyroid disease      Past Problem List  Patient Active Problem List   Diagnosis Code    Closed nondisplaced fracture of greater tuberosity of left humerus S42.255A     SURGICAL HISTORY       Past Surgical History:   Procedure Laterality Date    ABDOMINAL SURGERY      CERVICAL FUSION      LUMBAR FUSION      WRIST SURGERY Left      CURRENT MEDICATIONS       Discharge Medication List as of 12/4/2022  8:05 AM        CONTINUE these medications which have NOT CHANGED    Details   Prenatal Vit-Fe Fumarate-FA (TRINATAL RX 1) 60-1 MG TABS tablet Take 1 tablet by mouth dailyHistorical Med      sertraline (ZOLOFT) 100 MG tablet Take 100 mg by mouth nightlyHistorical Med      hydroxychloroquine (PLAQUENIL) 200 MG tablet Take by mouth nightlyHistorical Med      oxyCODONE-acetaminophen (PERCOCET) 5-325 MG per tablet Take 1 tablet by mouth every 8 hours as needed for Pain. Historical Med      gabapentin (NEURONTIN) 600 MG tablet Take 400 mg by mouth 3 times daily. Historical Med      iron polysaccharides (NIFEREX) 150 MG capsule Take 1 capsule by mouthHistorical Med      Mirabegron ER (MYRBETRIQ) 25 MG TB24 Myrbetriq 25 mg tablet,extended release   Take 1 tablet every day by oral route. Historical Med      escitalopram (LEXAPRO) 20 MG tablet Take 20 mg by mouth dailyHistorical Med      IRON PO Take by mouth dailyHistorical Med      vitamin C (ASCORBIC ACID) 500 MG tablet Take 500 mg by mouth dailyHistorical Med      levothyroxine (SYNTHROID) 175 MCG tablet Take 112 mcg by mouth Daily Indications: 6 days a week Historical Med      tiZANidine (ZANAFLEX) 2 MG tablet Take 2 mg by mouth every 6 hours as neededHistorical Med      Oxybutynin Chloride (DITROPAN PO) Take 20 mg by mouth dailyHistorical Med      Multiple Vitamins-Minerals (MULTIVITAMIN ADULT PO) Take by mouth dailyHistorical Med           ALLERGIES     is allergic to no known allergies. FAMILY HISTORY     has no family status information on file.       SOCIAL HISTORY       Social History     Tobacco Use    Smoking status: Former     Types: Cigarettes    Smokeless tobacco: Never   Vaping Use Vaping Use: Never used   Substance Use Topics    Alcohol use: Yes     Comment: wine daily    Drug use: No     PHYSICAL EXAM     INITIAL VITALS: BP (!) 131/90   Pulse 54   Resp 12   Ht 5' 6\" (1.676 m)   Wt 98 lb (44.5 kg)   SpO2 100%   BMI 15.82 kg/m²    Physical Exam  Vitals reviewed. Constitutional:       General: She is not in acute distress. Appearance: Normal appearance. She is not ill-appearing or toxic-appearing. HENT:      Head: Normocephalic and atraumatic. Right Ear: External ear normal.      Left Ear: External ear normal.      Nose: No congestion or rhinorrhea. Eyes:      Extraocular Movements: Extraocular movements intact. Pupils: Pupils are equal, round, and reactive to light. Cardiovascular:      Rate and Rhythm: Normal rate and regular rhythm. Pulses: Normal pulses. Heart sounds: Normal heart sounds. Pulmonary:      Effort: Pulmonary effort is normal. No respiratory distress. Breath sounds: Normal breath sounds. No wheezing. Abdominal:      General: Bowel sounds are normal. There is no distension. Palpations: Abdomen is soft. Tenderness: There is no abdominal tenderness. Musculoskeletal:         General: No signs of injury. Normal range of motion. Left shoulder: Deformity and tenderness present. Cervical back: No rigidity or tenderness. Skin:     General: Skin is warm and dry. Neurological:      Mental Status: She is alert and oriented to person, place, and time. Mental status is at baseline. Psychiatric:         Mood and Affect: Mood normal.         Behavior: Behavior normal.       MEDICAL DECISION MAKIN-year-old female with a fall. Patient with an injury to the left upper extremity. X-ray imaging did display signs of anterior subluxation of the left shoulder. The patient was consciously sedated and the reduction was conducted.   Imaging was pending at the time the patient was signed out to the next physician on staff.  The patient likely will be discharged with outpatient orthopedic surgery follow-up. CRITICAL CARE:       PROCEDURES:    Procedural sedation    Date/Time: 12/4/2022 6:56 AM  Performed by: Margot Frederick DO  Authorized by: Margot Frederick DO     Consent:     Consent obtained:  Written    Consent given by:  Patient    Risks, benefits, and alternatives were discussed: yes      Risks discussed:   Allergic reaction, dysrhythmia, inadequate sedation, nausea, prolonged sedation necessitating reversal, prolonged hypoxia resulting in organ damage, respiratory compromise necessitating ventilatory assistance and intubation and vomiting  Universal protocol:     Procedure explained and questions answered to patient or proxy's satisfaction: yes      Relevant documents present and verified: yes      Test results available: yes      Imaging studies available: yes      Required blood products, implants, devices, and special equipment available: yes      Immediately prior to procedure, a time out was called: yes      Patient identity confirmed:  Verbally with patient, arm band, provided demographic data and hospital-assigned identification number  Indications:     Procedure performed:  Dislocation reduction    Procedure necessitating sedation performed by:  Physician performing sedation    Intended level of sedation:  Moderate  Pre-sedation assessment:     Time since last food or drink:  4 hrs    ASA classification: class 2 - patient with mild systemic disease      Mouth opening:  3 or more finger widths    Thyromental distance:  4 finger widths    Mallampati score:  III - soft palate, base of uvula visible    Neck mobility: normal      Pre-sedation assessments completed and reviewed: airway patency, anesthesia/sedation history, cardiovascular function, hydration status, mental status, nausea/vomiting, pain level, respiratory function and temperature      History of difficult intubation: no      Pre-sedation assessment completed:  12/4/2022 5:51 AM  Immediate pre-procedure details:     Reassessment: Patient reassessed immediately prior to procedure      Reviewed: vital signs, relevant labs/tests and NPO status      Verified: bag valve mask available, emergency equipment available, intubation equipment available, IV patency confirmed, oxygen available, reversal medications available and suction available    Procedure details (see MAR for exact dosages):     Sedation start time:  12/4/2022 5:53 AM    Preoxygenation:  Nasal cannula    Sedation:  Propofol    Analgesia:  Fentanyl    Intra-procedure monitoring:  Blood pressure monitoring, cardiac monitor, continuous pulse oximetry, continuous capnometry, frequent LOC assessments and frequent vital sign checks    Intra-procedure events: none      Sedation end time:  12/4/2022 6:00 AM    Total sedation time (minutes):  7  Post-procedure details:     Post-sedation assessment completed:  12/4/2022 6:15 AM    Attendance: Constant attendance by certified staff until patient recovered      Recovery: Patient returned to pre-procedure baseline      Post-sedation assessments completed and reviewed: airway patency, cardiovascular function, hydration status, mental status, nausea/vomiting, pain level, respiratory function and temperature      Specimens recovered:  None    Patient is stable for discharge or admission: yes      Procedure completion:  Tolerated well, no immediate complications  Ortho Injury    Date/Time: 12/4/2022 6:59 AM  Performed by: Kyree Flanagan DO  Authorized by: Kyree Flanagan DO   Consent: Written consent obtained.   Risks and benefits: risks, benefits and alternatives were discussed  Consent given by: patient  Patient understanding: patient states understanding of the procedure being performed  Patient consent: the patient's understanding of the procedure matches consent given  Procedure consent: procedure consent matches procedure scheduled  Relevant documents: relevant documents present and verified  Test results: test results available and properly labeled  Imaging studies: imaging studies available  Patient identity confirmed: verbally with patient, arm band, provided demographic data and hospital-assigned identification number  Time out: Immediately prior to procedure a \"time out\" was called to verify the correct patient, procedure, equipment, support staff and site/side marked as required. Injury location: shoulder  Location details: left shoulder  Injury type: dislocation  Dislocation type: anterior  Hill-Sachs deformity: no  Chronicity: new  Pre-procedure neurovascular assessment: neurovascularly intact  Pre-procedure distal perfusion: normal  Pre-procedure neurological function: normal  Pre-procedure range of motion: normal    Anesthesia:  Local anesthesia used: no    Sedation:  Patient sedated: yes  Sedation type: moderate (conscious) sedation  Sedatives: propofol  Analgesia: fentanyl  Sedation start date/time: 12/4/2022 5:53 AM  Sedation end date/time: 12/4/2022 6:00 AM  Vitals: Vital signs were monitored during sedation. Manipulation performed: yes  Reduction method: external rotation  Reduction successful: yes  X-ray confirmed reduction: yes  Immobilization: sling  Post-procedure neurovascular assessment: post-procedure neurovascularly intact  Post-procedure distal perfusion: normal  Post-procedure neurological function: normal  Post-procedure range of motion: normal  Patient tolerance: patient tolerated the procedure well with no immediate complications        DIAGNOSTIC RESULTS   EKG:All EKG's are interpreted by the Emergency Department Physician who either signs or Co-signs this chart in the absence of a cardiologist.        RADIOLOGY:All plain film, CT, MRI, and formal ultrasound images (except ED bedside ultrasound) are read by the radiologist, see reports below, unless otherwisenoted in MDM or here.   CT CERVICAL SPINE WO CONTRAST   Final Result   CT HEAD:      No CT evidence for acute intracranial abnormality. .      CT CERVICAL SPINE:      No acute fracture or subluxation of the cervical spine. Anterior fusion C5 and C6 with C4-C5 disc space degenerative changes. CT HEAD WO CONTRAST   Final Result   CT HEAD:      No CT evidence for acute intracranial abnormality. .      CT CERVICAL SPINE:      No acute fracture or subluxation of the cervical spine. Anterior fusion C5 and C6 with C4-C5 disc space degenerative changes. XR SHOULDER LEFT (MIN 2 VIEWS)   Final Result   1. Interval reduction of left shoulder anterior dislocation. There remains   subtle inferior subluxation of the humerus with respect to the glenoid. 2. Findings suggestive of nondisplaced greater tuberosity fracture. XR SHOULDER LEFT (MIN 2 VIEWS)   Final Result   Anterior dislocation left glenohumeral joint. Subtle focal cortical irregularity of the greater tuberosity raises concern   for nondisplaced fracture. XR HUMERUS LEFT (MIN 2 VIEWS)   Final Result   Anterior dislocation left glenohumeral joint. Subtle focal cortical irregularity of the greater tuberosity raises concern   for nondisplaced fracture. XR PELVIS (1-2 VIEWS)   Final Result   No acute osseous abnormality. XR CHEST PORTABLE   Preliminary Result   No definable acute cardiopulmonary abnormality. Complete anterior dislocation of the left shoulder joint. LABS: All lab results were reviewed by myself, and all abnormals are listed below.   Labs Reviewed - No data to display    EMERGENCY DEPARTMENTCOURSE:         Vitals:    Vitals:    12/04/22 0612 12/04/22 0645 12/04/22 0652 12/04/22 0715   BP: 122/73 128/85  (!) 131/90   Pulse: 50  55 54   Resp: 12      SpO2: 97% 100% 99% 100%   Weight:       Height:           The patient was given the following medications while in the emergency department:  Orders Placed This Encounter   Medications    propofol injection 150 mg     Order Specific Question:   Titrate Infusion? Answer:   No     Order Specific Question:   Infusion Dose: Answer:   N/A - Use Bolus Dose     Order Specific Question:   Bolus dose (mg): Answer:   150    DISCONTD: 0.9 % sodium chloride bolus    ibuprofen (ADVIL;MOTRIN) 600 MG tablet     Sig: Take 1 tablet by mouth 3 times daily as needed for Pain     Dispense:  30 tablet     Refill:  0     CONSULTS:  None    FINAL IMPRESSION      1. Fall, initial encounter    2. Dislocation of left shoulder joint, initial encounter    3. Closed nondisplaced fracture of greater tuberosity of left humerus, initial encounter          DISPOSITION/PLAN   DISPOSITION Decision To Discharge 12/04/2022 08:05:14 AM      PATIENT REFERRED [place]  [id number] [facility] [address]  [address] [place] [address]  [phone]    Schedule an appointment as soon as possible for a visit in 2 days      [place] [address]  [phone]    Schedule an appointment as soon as possible for a visit in 2 days      [facility]  [address] [facility]  [phone]  Go to   As needed, If symptoms worsen  DISCHARGE MEDICATIONS:  Discharge Medication List as of 12/4/2022  8:05 AM        The care is provided during an unprecedented national emergency due to the novel coronavirus, COVID 19.   DO Vladislav Bentley DO  12/04/22 1943

## 2022-12-04 NOTE — ED NOTES
Post reduction and sling applied 0600  Pt alert @ 1482. Side rails up. Call light within reach.       Jessie Rod, DAMIAN  12/04/22 [id number]  DAMIAN Johns  12/04/22 4511

## 2022-12-04 NOTE — ED NOTES
Pt presenting to the ED with complaints of left shoulder pain. Pt reports that she was taking a shower at 0400 and she went to get out and lost her balance. Pt reports hitting the shower wall and sliding down. Pt denies hitting her head. Pt denies LOC. Pt is A&Ox4.       Pinky Browne RN  12/04/22 [id number]

## 2023-07-21 ENCOUNTER — HOSPITAL ENCOUNTER (OUTPATIENT)
Dept: NUCLEAR MEDICINE | Age: 68
Discharge: HOME | End: 2023-07-23
Payer: COMMERCIAL

## 2023-07-21 DIAGNOSIS — R10.13 ABDOMINAL PAIN, EPIGASTRIC: ICD-10-CM

## 2023-07-21 PROCEDURE — A9541 TC99M SULFUR COLLOID: HCPCS | Performed by: INTERNAL MEDICINE

## 2023-07-21 PROCEDURE — 3430000000 HC RX DIAGNOSTIC RADIOPHARMACEUTICAL: Performed by: INTERNAL MEDICINE

## 2023-07-21 PROCEDURE — 78264 GASTRIC EMPTYING IMG STUDY: CPT

## 2023-07-21 RX ADMIN — Medication 2.8 MILLICURIE: at 07:25

## 2023-11-09 ENCOUNTER — HOSPITAL ENCOUNTER (OUTPATIENT)
Age: 68
Discharge: HOME | End: 2023-11-09
Payer: COMMERCIAL

## 2023-11-09 LAB
ALBUMIN SERPL-MCNC: 4 G/DL (ref 3.5–5.2)
ALBUMIN/GLOB SERPL: 2 {RATIO} (ref 1–2.5)
ALP SERPL-CCNC: 72 U/L (ref 35–104)
ALT SERPL-CCNC: 76 U/L (ref 5–33)
AST SERPL-CCNC: 38 U/L
BILIRUB DIRECT SERPL-MCNC: <0.1 MG/DL
BILIRUB INDIRECT SERPL-MCNC: ABNORMAL MG/DL (ref 0–1)
BILIRUB SERPL-MCNC: 0.2 MG/DL (ref 0.3–1.2)
IGA SERPL-MCNC: 66 MG/DL (ref 70–400)
PROT SERPL-MCNC: 6 G/DL (ref 6.4–8.3)

## 2023-11-09 PROCEDURE — 36415 COLL VENOUS BLD VENIPUNCTURE: CPT

## 2023-11-09 PROCEDURE — 83516 IMMUNOASSAY NONANTIBODY: CPT

## 2023-11-09 PROCEDURE — 82784 ASSAY IGA/IGD/IGG/IGM EACH: CPT

## 2023-11-09 PROCEDURE — 80076 HEPATIC FUNCTION PANEL: CPT

## 2023-11-13 LAB — TTG IGA SER IA-ACNC: <0.1 U/ML

## 2023-12-11 ENCOUNTER — HOSPITAL ENCOUNTER (OUTPATIENT)
Age: 68
Setting detail: SPECIMEN
Discharge: HOME | End: 2023-12-11
Payer: COMMERCIAL

## 2023-12-11 LAB — A1AT SERPL-MCNC: 140 MG/DL (ref 90–200)

## 2023-12-11 PROCEDURE — 36415 COLL VENOUS BLD VENIPUNCTURE: CPT

## 2023-12-11 PROCEDURE — 86708 HEPATITIS A ANTIBODY: CPT

## 2023-12-11 PROCEDURE — 86803 HEPATITIS C AB TEST: CPT

## 2023-12-11 PROCEDURE — 82103 ALPHA-1-ANTITRYPSIN TOTAL: CPT

## 2023-12-11 PROCEDURE — 83516 IMMUNOASSAY NONANTIBODY: CPT

## 2023-12-11 PROCEDURE — 87340 HEPATITIS B SURFACE AG IA: CPT

## 2023-12-11 PROCEDURE — 86317 IMMUNOASSAY INFECTIOUS AGENT: CPT

## 2023-12-13 LAB — SMOOTH MUSCLE ANTIBODY: 3 UNITS (ref 0–19)

## 2023-12-14 LAB
HAV AB SERPL IA-ACNC: NONREACTIVE
HBV SURFACE AB SERPL IA-ACNC: 9.62 MIU/ML
HBV SURFACE AG SERPL QL IA: NONREACTIVE
HCV AB SERPL QL IA: NONREACTIVE

## 2023-12-27 ENCOUNTER — HOSPITAL ENCOUNTER (OUTPATIENT)
Dept: ULTRASOUND IMAGING | Age: 68
Discharge: HOME | End: 2023-12-29
Attending: INTERNAL MEDICINE
Payer: COMMERCIAL

## 2023-12-27 DIAGNOSIS — R74.8 ABNORMAL LIVER ENZYMES: ICD-10-CM

## 2023-12-27 PROCEDURE — 76705 ECHO EXAM OF ABDOMEN: CPT

## 2024-01-22 ENCOUNTER — HOSPITAL ENCOUNTER (OUTPATIENT)
Age: 69
Discharge: HOME | End: 2024-01-22
Payer: COMMERCIAL

## 2024-01-22 LAB
EKG ATRIAL RATE: 50 BPM
EKG P AXIS: 63 DEGREES
EKG P-R INTERVAL: 172 MS
EKG Q-T INTERVAL: 416 MS
EKG QRS DURATION: 80 MS
EKG QTC CALCULATION (BAZETT): 379 MS
EKG R AXIS: 71 DEGREES
EKG T AXIS: 59 DEGREES
EKG VENTRICULAR RATE: 50 BPM

## 2024-01-22 PROCEDURE — 36415 COLL VENOUS BLD VENIPUNCTURE: CPT

## 2024-01-22 PROCEDURE — 84450 TRANSFERASE (AST) (SGOT): CPT

## 2024-01-22 PROCEDURE — 93005 ELECTROCARDIOGRAM TRACING: CPT | Performed by: INTERNAL MEDICINE

## 2024-01-22 PROCEDURE — 82977 ASSAY OF GGT: CPT

## 2024-01-22 PROCEDURE — 83883 ASSAY NEPHELOMETRY NOT SPEC: CPT

## 2024-01-22 PROCEDURE — 84460 ALANINE AMINO (ALT) (SGPT): CPT

## 2024-01-22 PROCEDURE — 84520 ASSAY OF UREA NITROGEN: CPT

## 2024-01-25 LAB
ALANINE AMINOTRANSFERASE, FIBROMETER: 49 U/L (ref 5–40)
ALPHA-2-MACROGLOBULIN, FIBROMETER: 230 MG/DL (ref 131–293)
ASPARTATE AMINOTRANSFERASE, FIBROMETER: 22 U/L (ref 9–40)
CIRRHOMETER PATIENT SCORE: 0
EER FIBROMETER REPORT: ABNORMAL
FIBROMETER INTERPRETATION: ABNORMAL
FIBROMETER PATIENT SCORE: 0.21
FIBROMETER PLATELET COUNT: 246
FIBROMETER PROTHROMBIN INDEX: 119 % (ref 90–120)
FIBROSIS METAVIR CLASSIFICATION: ABNORMAL
GAMMA GLUTAMYL TRANSFERASE, FIBROMETER: 112 U/L (ref 7–33)
INFLAMETER METAVIR CLASSIFICATION: ABNORMAL
INFLAMETER PATIENT SCORE: 0.22
UREA NITROGEN, FIBROMETER: 18 MG/DL (ref 7–20)